# Patient Record
Sex: MALE | Race: WHITE
[De-identification: names, ages, dates, MRNs, and addresses within clinical notes are randomized per-mention and may not be internally consistent; named-entity substitution may affect disease eponyms.]

---

## 2019-08-06 ENCOUNTER — HOSPITAL ENCOUNTER (OUTPATIENT)
Dept: HOSPITAL 75 - RAD FS | Age: 39
End: 2019-08-06
Attending: NURSE PRACTITIONER
Payer: COMMERCIAL

## 2019-08-06 DIAGNOSIS — M25.461: Primary | ICD-10-CM

## 2019-08-06 PROCEDURE — 73562 X-RAY EXAM OF KNEE 3: CPT

## 2019-08-06 NOTE — DIAGNOSTIC IMAGING REPORT
INDICATION:  Medial right knee pain x2 months. No known injury.



TECHNIQUE:  3 views of the right knee  



CORRELATION STUDY:  None



FINDINGS: 

Well-corticated bone fragmentation at the tibial tuberosity

likely reflective previous remote injury. The distal femur

proximal tibia and foot demonstrate no acute abnormality. The

joint spaces demonstrate minimal narrowing medially. The

articular surfaces are smooth.  Small suprapatellar joint

effusion.



IMPRESSION: 

1.  Negative for acute bony abnormality of the knee.  Small joint

effusion. If further assessment is desired, MRI may be of

additional benefit.



Dictated by: 



  Dictated on workstation # [id number]